# Patient Record
(demographics unavailable — no encounter records)

---

## 2024-11-13 NOTE — HISTORY OF PRESENT ILLNESS
[FreeTextEntry1] : Patient is a 27-year-old here for routine gynecological follow-up without complaints this menstrual period 10/25/2024 UCG negative patient is not currently on any birth control and has not been sexually active for the past few months.  The patient does however note increased discharge no itching burning or odor.

## 2024-11-13 NOTE — DISCUSSION/SUMMARY
[FreeTextEntry1] : Patient had a yeast infection and will be treated with Diflucan. Pap smear cultures were done. Affirm culture was done as well. Patient was counseled extensively regarding birth control options. Safe sex precautions were discussed with the patient.

## 2024-11-13 NOTE — PHYSICAL EXAM
[Chaperone Present] : A chaperone was present in the examining room during all aspects of the physical examination [02135] : A chaperone was present during the pelvic exam. [FreeTextEntry2] : Lisset Luo  [TextEntry] :  Physical exam: Breast: No discharge no masses no adenopathy Abdomen: Soft nontender nondistended Pelvic exam: Vulva and vagina within normal limits. Cervix: Long Long closed posterior. Curdy discharge noted consistent with yeast. Uterus: Patient has a normal-sized uterus. No palpable adnexal masses were noted